# Patient Record
Sex: FEMALE | Race: BLACK OR AFRICAN AMERICAN | Employment: UNEMPLOYED | URBAN - METROPOLITAN AREA
[De-identification: names, ages, dates, MRNs, and addresses within clinical notes are randomized per-mention and may not be internally consistent; named-entity substitution may affect disease eponyms.]

---

## 2021-06-28 ENCOUNTER — HOSPITAL ENCOUNTER (EMERGENCY)
Age: 21
Discharge: HOME | End: 2021-06-28
Attending: EMERGENCY MEDICINE
Payer: MEDICAID

## 2021-06-28 ENCOUNTER — APPOINTMENT (OUTPATIENT)
Dept: ULTRASOUND IMAGING | Age: 21
End: 2021-06-28
Attending: EMERGENCY MEDICINE
Payer: MEDICAID

## 2021-06-28 VITALS
WEIGHT: 117 LBS | OXYGEN SATURATION: 100 % | TEMPERATURE: 98.3 F | SYSTOLIC BLOOD PRESSURE: 100 MMHG | HEIGHT: 62 IN | BODY MASS INDEX: 21.53 KG/M2 | HEART RATE: 84 BPM | RESPIRATION RATE: 16 BRPM | DIASTOLIC BLOOD PRESSURE: 61 MMHG

## 2021-06-28 DIAGNOSIS — B96.89 BV (BACTERIAL VAGINOSIS): ICD-10-CM

## 2021-06-28 DIAGNOSIS — O26.892 ABDOMINAL PAIN DURING PREGNANCY IN SECOND TRIMESTER: Primary | ICD-10-CM

## 2021-06-28 DIAGNOSIS — N76.0 BV (BACTERIAL VAGINOSIS): ICD-10-CM

## 2021-06-28 DIAGNOSIS — R10.9 ABDOMINAL PAIN DURING PREGNANCY IN SECOND TRIMESTER: Primary | ICD-10-CM

## 2021-06-28 LAB
ALBUMIN SERPL-MCNC: 2.6 G/DL (ref 3.5–5)
ALBUMIN/GLOB SERPL: 0.8 {RATIO} (ref 1.1–2.2)
ALP SERPL-CCNC: 49 U/L (ref 45–117)
ALT SERPL-CCNC: 16 U/L (ref 12–78)
ANION GAP SERPL CALC-SCNC: 8 MMOL/L (ref 5–15)
APPEARANCE UR: ABNORMAL
AST SERPL-CCNC: 13 U/L (ref 15–37)
BACTERIA URNS QL MICRO: ABNORMAL /HPF
BASOPHILS # BLD: 0.1 K/UL (ref 0–0.1)
BASOPHILS NFR BLD: 1 % (ref 0–1)
BILIRUB SERPL-MCNC: 0.3 MG/DL (ref 0.2–1)
BILIRUB UR QL CFM: NEGATIVE
BUN SERPL-MCNC: 7 MG/DL (ref 6–20)
BUN/CREAT SERPL: 14 (ref 12–20)
CALCIUM SERPL-MCNC: 7.9 MG/DL (ref 8.5–10.1)
CHLORIDE SERPL-SCNC: 104 MMOL/L (ref 97–108)
CLUE CELLS VAG QL WET PREP: NORMAL
CO2 SERPL-SCNC: 23 MMOL/L (ref 21–32)
COLOR UR: ABNORMAL
CREAT SERPL-MCNC: 0.51 MG/DL (ref 0.55–1.02)
DIFFERENTIAL METHOD BLD: ABNORMAL
EOSINOPHIL # BLD: 0.1 K/UL (ref 0–0.4)
EOSINOPHIL NFR BLD: 1 % (ref 0–7)
EPITH CASTS URNS QL MICRO: ABNORMAL /LPF
ERYTHROCYTE [DISTWIDTH] IN BLOOD BY AUTOMATED COUNT: 13.1 % (ref 11.5–14.5)
GLOBULIN SER CALC-MCNC: 3.2 G/DL (ref 2–4)
GLUCOSE SERPL-MCNC: 92 MG/DL (ref 65–100)
GLUCOSE UR STRIP.AUTO-MCNC: NEGATIVE MG/DL
HCG SERPL-ACNC: ABNORMAL MIU/ML (ref 0–6)
HCT VFR BLD AUTO: 31.2 % (ref 35–47)
HGB BLD-MCNC: 10.6 G/DL (ref 11.5–16)
HGB UR QL STRIP: NEGATIVE
IMM GRANULOCYTES # BLD AUTO: 0.1 K/UL (ref 0–0.04)
IMM GRANULOCYTES NFR BLD AUTO: 1 % (ref 0–0.5)
KETONES UR QL STRIP.AUTO: NEGATIVE MG/DL
KOH PREP SPEC: NORMAL
LEUKOCYTE ESTERASE UR QL STRIP.AUTO: ABNORMAL
LYMPHOCYTES # BLD: 1.4 K/UL (ref 0.8–3.5)
LYMPHOCYTES NFR BLD: 16 % (ref 12–49)
MCH RBC QN AUTO: 32.8 PG (ref 26–34)
MCHC RBC AUTO-ENTMCNC: 34 G/DL (ref 30–36.5)
MCV RBC AUTO: 96.6 FL (ref 80–99)
MONOCYTES # BLD: 0.9 K/UL (ref 0–1)
MONOCYTES NFR BLD: 10 % (ref 5–13)
MUCOUS THREADS URNS QL MICRO: ABNORMAL /LPF
NEUTS SEG # BLD: 6.3 K/UL (ref 1.8–8)
NEUTS SEG NFR BLD: 71 % (ref 32–75)
NITRITE UR QL STRIP.AUTO: NEGATIVE
NRBC # BLD: 0 K/UL (ref 0–0.01)
NRBC BLD-RTO: 0 PER 100 WBC
PH UR STRIP: 6.5 [PH] (ref 5–8)
PLATELET # BLD AUTO: 180 K/UL (ref 150–400)
PMV BLD AUTO: 10.9 FL (ref 8.9–12.9)
POTASSIUM SERPL-SCNC: 3.4 MMOL/L (ref 3.5–5.1)
PROT SERPL-MCNC: 5.8 G/DL (ref 6.4–8.2)
PROT UR STRIP-MCNC: ABNORMAL MG/DL
RBC # BLD AUTO: 3.23 M/UL (ref 3.8–5.2)
RBC #/AREA URNS HPF: ABNORMAL /HPF (ref 0–5)
RBC MORPH BLD: ABNORMAL
SERVICE CMNT-IMP: NORMAL
SODIUM SERPL-SCNC: 135 MMOL/L (ref 136–145)
SP GR UR REFRACTOMETRY: 1.02 (ref 1–1.03)
T VAGINALIS VAG QL WET PREP: NORMAL
UA: UC IF INDICATED,UAUC: ABNORMAL
UROBILINOGEN UR QL STRIP.AUTO: 1 EU/DL (ref 0.2–1)
WBC # BLD AUTO: 8.9 K/UL (ref 3.6–11)
WBC URNS QL MICRO: ABNORMAL /HPF (ref 0–4)

## 2021-06-28 PROCEDURE — 87210 SMEAR WET MOUNT SALINE/INK: CPT

## 2021-06-28 PROCEDURE — 87491 CHLMYD TRACH DNA AMP PROBE: CPT

## 2021-06-28 PROCEDURE — 36415 COLL VENOUS BLD VENIPUNCTURE: CPT

## 2021-06-28 PROCEDURE — 84702 CHORIONIC GONADOTROPIN TEST: CPT

## 2021-06-28 PROCEDURE — 76815 OB US LIMITED FETUS(S): CPT

## 2021-06-28 PROCEDURE — 80053 COMPREHEN METABOLIC PANEL: CPT

## 2021-06-28 PROCEDURE — 99283 EMERGENCY DEPT VISIT LOW MDM: CPT

## 2021-06-28 PROCEDURE — 81001 URINALYSIS AUTO W/SCOPE: CPT

## 2021-06-28 PROCEDURE — 85025 COMPLETE CBC W/AUTO DIFF WBC: CPT

## 2021-06-28 RX ORDER — METRONIDAZOLE 500 MG/1
500 TABLET ORAL 2 TIMES DAILY
Qty: 14 TABLET | Refills: 0 | Status: SHIPPED | OUTPATIENT
Start: 2021-06-28 | End: 2021-07-05

## 2021-06-28 RX ORDER — CEPHALEXIN 500 MG/1
500 CAPSULE ORAL 2 TIMES DAILY
Qty: 14 CAPSULE | Refills: 0 | Status: SHIPPED | OUTPATIENT
Start: 2021-06-28 | End: 2021-07-05

## 2021-06-28 NOTE — ED TRIAGE NOTES
A0 thinks she is 20 weeks pregnant, is not receiving prenatal care, presents for left sided abdominal pain x 2 days, denies vaginal bleeding.

## 2021-06-28 NOTE — ED PROVIDER NOTES
EMERGENCY DEPARTMENT HISTORY AND PHYSICAL EXAM      Date: 2021  Patient Name: Rosa Maria Contreras    History of Presenting Illness     Chief Complaint   Patient presents with    Pregnancy Problem       History Provided By: Patient    HPI: Rosa Maria Contreras, 24 y.o. female with PMHx significant for nothing,  currently approximately 25wks pregnant based on US in Feb who presents with a cc of RLQ abd pain for the last 2 days. Pain is described as sharp, worse with movement. She has has no prenatal care. Had an [address] in Feb at an OSH when she was having bleeding and was dx with a subchorionic hemorrhage. She is concerned this may have recurred. No vaginal bleeding, discharge, or loss of fluid. +fetal movement. No urinary sx. No N/V/D. No CP or SOB. No prior abd surgeries. PCP: Other, MD Carlene    There are no other complaints, changes, or physical findings at this time. Past History     Past Medical History:  History reviewed. No pertinent past medical history. Past Surgical History:  History reviewed. No pertinent surgical history. Family History:  History reviewed. No pertinent family history. Social History:  Social History     Tobacco Use    Smoking status: Current Every Day Smoker    Smokeless tobacco: Never Used   Substance Use Topics    Alcohol use: Not Currently    Drug use: Yes     Types: Heroin     Allergies:  No Known Allergies  Review of Systems   Review of Systems   Constitutional: Negative for chills and fever. HENT: Negative for congestion, rhinorrhea and sore throat. Respiratory: Negative for cough and shortness of breath. Cardiovascular: Negative for chest pain. Gastrointestinal: Positive for abdominal pain. Negative for nausea and vomiting. Genitourinary: Negative for dysuria and urgency. Skin: Negative for rash. Neurological: Negative for dizziness, light-headedness and headaches. All other systems reviewed and are negative.     Physical Exam   Physical Exam  Vitals and nursing note reviewed. Constitutional:       General: She is not in acute distress. Appearance: She is well-developed. HENT:      Head: Normocephalic and atraumatic. Eyes:      Conjunctiva/sclera: Conjunctivae normal.      Pupils: Pupils are equal, round, and reactive to light. Cardiovascular:      Rate and Rhythm: Normal rate and regular rhythm. Pulmonary:      Effort: Pulmonary effort is normal. No respiratory distress. Breath sounds: Normal breath sounds. No stridor. Abdominal:      Palpations: Abdomen is soft. Tenderness: There is no abdominal tenderness. Comments: gravid   Musculoskeletal:         General: Normal range of motion. Cervical back: Normal range of motion. Skin:     General: Skin is warm and dry. Neurological:      Mental Status: She is alert and oriented to person, place, and time. Diagnostic Study Results   Labs -     Recent Results (from the past 12 hour(s))   RENATA, OTHER SOURCES    Collection Time: 06/28/21  3:28 PM    Specimen: Vagina; Other   Result Value Ref Range    Special Requests: NO SPECIAL REQUESTS      KOH NO YEAST SEEN     WET PREP    Collection Time: 06/28/21  3:28 PM    Specimen: Miscellaneous sample   Result Value Ref Range    Clue cells CLUE CELLS PRESENT      Wet prep NO TRICHOMONAS SEEN     CBC WITH AUTOMATED DIFF    Collection Time: 06/28/21  3:28 PM   Result Value Ref Range    WBC 8.9 3.6 - 11.0 K/uL    RBC 3.23 (L) 3.80 - 5.20 M/uL    HGB 10.6 (L) 11.5 - 16.0 g/dL    HCT 31.2 (L) 35.0 - 47.0 %    MCV 96.6 80.0 - 99.0 FL    MCH 32.8 26.0 - 34.0 PG    MCHC 34.0 30.0 - 36.5 g/dL    RDW 13.1 11.5 - 14.5 %    PLATELET 677 895 - 682 K/uL    MPV 10.9 8.9 - 12.9 FL    NRBC 0.0 0  WBC    ABSOLUTE NRBC 0.00 0.00 - 0.01 K/uL    NEUTROPHILS 71 32 - 75 %    LYMPHOCYTES 16 12 - 49 %    MONOCYTES 10 5 - 13 %    EOSINOPHILS 1 0 - 7 %    BASOPHILS 1 0 - 1 %    IMMATURE GRANULOCYTES 1 (H) 0.0 - 0.5 %    ABS.  NEUTROPHILS 6.3 1.8 - 8.0 K/UL ABS. LYMPHOCYTES 1.4 0.8 - 3.5 K/UL    ABS. MONOCYTES 0.9 0.0 - 1.0 K/UL    ABS. EOSINOPHILS 0.1 0.0 - 0.4 K/UL    ABS. BASOPHILS 0.1 0.0 - 0.1 K/UL    ABS. IMM. GRANS. 0.1 (H) 0.00 - 0.04 K/UL    DF SMEAR SCANNED      RBC COMMENTS ANISOCYTOSIS  2+       METABOLIC PANEL, COMPREHENSIVE    Collection Time: 06/28/21  3:28 PM   Result Value Ref Range    Sodium 135 (L) 136 - 145 mmol/L    Potassium 3.4 (L) 3.5 - 5.1 mmol/L    Chloride 104 97 - 108 mmol/L    CO2 23 21 - 32 mmol/L    Anion gap 8 5 - 15 mmol/L    Glucose 92 65 - 100 mg/dL    BUN 7 6 - 20 MG/DL    Creatinine 0.51 (L) 0.55 - 1.02 MG/DL    BUN/Creatinine ratio 14 12 - 20      GFR est AA >60 >60 ml/min/1.73m2    GFR est non-AA >60 >60 ml/min/1.73m2    Calcium 7.9 (L) 8.5 - 10.1 MG/DL    Bilirubin, total 0.3 0.2 - 1.0 MG/DL    ALT (SGPT) 16 12 - 78 U/L    AST (SGOT) 13 (L) 15 - 37 U/L    Alk.  phosphatase 49 45 - 117 U/L    Protein, total 5.8 (L) 6.4 - 8.2 g/dL    Albumin 2.6 (L) 3.5 - 5.0 g/dL    Globulin 3.2 2.0 - 4.0 g/dL    A-G Ratio 0.8 (L) 1.1 - 2.2     BETA HCG, QT    Collection Time: 06/28/21  3:28 PM   Result Value Ref Range    Beta HCG, QT 18,854 (H) 0 - 6 MIU/ML   URINALYSIS W/ REFLEX CULTURE    Collection Time: 06/28/21  4:06 PM    Specimen: Miscellaneous sample    Urine specimen   Result Value Ref Range    Color DARK YELLOW      Appearance CLOUDY (A) CLEAR      Specific gravity 1.025 1.003 - 1.030      pH (UA) 6.5 5.0 - 8.0      Protein TRACE (A) NEG mg/dL    Glucose Negative NEG mg/dL    Ketone Negative NEG mg/dL    Blood Negative NEG      Urobilinogen 1.0 0.2 - 1.0 EU/dL    Nitrites Negative NEG      Leukocyte Esterase SMALL (A) NEG      WBC 0-4 0 - 4 /hpf    RBC 0-5 0 - 5 /hpf    Epithelial cells MANY (A) FEW /lpf    Bacteria 2+ (A) NEG /hpf    UA:UC IF INDICATED CULTURE NOT INDICATED BY UA RESULT CNI      Mucus 2+ (A) NEG /lpf   BILIRUBIN, CONFIRM    Collection Time: 06/28/21  4:06 PM   Result Value Ref Range    Bilirubin UA, confirm Negative NEG         Radiologic Studies -   US MilaS LTD   Final Result   Single viable 25 week 2 day intrauterine pregnancy. US MilaS LTD    Result Date: 6/28/2021  Single viable 25 week 2 day intrauterine pregnancy. Medical Decision Making   I am the first provider for this patient. I reviewed the vital signs, available nursing notes, past medical history, past surgical history, family history and social history. Vital Signs-Reviewed the patient's vital signs. Patient Vitals for the past 12 hrs:   Temp Pulse Resp BP SpO2   06/28/21 1452 98.3 °F (36.8 °C) 84 16 100/61 100 %       Pulse Oximetry Analysis - 100% on ra      Records Reviewed: Nursing Notes and Old Medical Records    Provider Notes (Medical Decision Making):   Patient presents with a chief complaint of abdominal pain and pregnancy. Notes that she had an ultrasound in February that showed her at 7 weeks, based on this her approximate gestational age is around 22 weeks. On exam she is overall well-appearing with stable vital signs. Has no significant lower abdominal tenderness on exam but abdomen is gravid. Will check basic lab work, pelvic swabs, ultrasound, urinalysis. Differential includes round ligament pain, UTI, STI. Less likely appendicitis given no significant pain on palpation. ED Course:   Initial assessment performed. The patients presenting problems have been discussed, and they are in agreement with the care plan formulated and outlined with them. I have encouraged them to ask questions as they arise throughout their visit. ED Course as of Jun 28 1648   Mon Jun 28, 2021   1520 FHR in the 150s    [HEVER]      ED Course User Index  Jann rFiedman MD       Ultrasound shows pregnancy at 25 weeks and 2 days. Swabs notable for BV. We will treat with Flagyl. She does have bacteria in her urine however it is a contaminated urine. Given pregnancy as well as overall poor follow-up will start on antibiotics. Patient was provided resources for outpatient follow-up. Procedures:  Procedures    Critical Care:  none    Disposition:  Discharge Note:  The patient has been re-evaluated and is ready for discharge. Reviewed available results with patient. Counseled patient on diagnosis and care plan. Patient has expressed understanding, and all questions have been answered. Patient agrees with plan and agrees to follow up as recommended, or to return to the ED if their symptoms worsen. Discharge instructions have been provided and explained to the patient, along with reasons to return to the ED. PLAN:  1. Current Discharge Medication List      START taking these medications    Details   metroNIDAZOLE (FlagyL) 500 mg tablet Take 1 Tablet by mouth two (2) times a day for 7 days. Qty: 14 Tablet, Refills: 0  Start date: 6/28/2021, End date: 7/5/2021      cephALEXin (Keflex) 500 mg capsule Take 1 Capsule by mouth two (2) times a day for 7 days. Qty: 14 Capsule, Refills: 0  Start date: 6/28/2021, End date: 7/5/2021           2. Follow-up Information     Follow up With Specialties Details Why [address] [place] [address]  Schedule an appointment as soon as possible for a visit   [address] [place] [address] [address] [address]  [phone]    [address] [place] [address] EMERGENCY DEPT Emergency Medicine  As needed, If symptoms worsen Har[address]reet [address]        Return to ED if worse     Diagnosis     Clinical Impression:   1. Abdominal pain during pregnancy in second trimester    2. BV (bacterial vaginosis)            Please note that this dictation was completed with [employer], the computer voice recognition software. Quite often unanticipated grammatical, syntax, homophones, and other interpretive errors are inadvertently transcribed by the computer software. Please disregard these errors.   Please excuse any errors that have escaped final proofreading

## 2021-06-28 NOTE — PROGRESS NOTES
CM opened case for assessment of D/C planning needs, CM reviewed chart. CM receive consult patient pregnant and needs housing resources also has not received prenatal care and and current substance use. CM provide patient with resources and number to CC with [facility] for addition resources.      [address] [place] [address]  [phone]

## 2021-06-28 NOTE — ED NOTES
Pt in with right lower quadrant abdominal pain that started this morning. Pt is approximately 25 weeks pregnant with her second child, has had no prenatal care, and is a 5x per week heroin user. Pt reports being diagnosed with a subchorionic hemorrhage at 7 weeks, is concerned for this new onset of pain. She endorses fetal movement, FHR 150s. Pt denies vaginal bleeding, dysuria. Emergency Department Nursing Plan of Care       The Nursing Plan of Care is developed from the Nursing assessment and Emergency Department Attending provider initial evaluation. The plan of care may be reviewed in the ED Provider note.     The Plan of Care was developed with the following considerations:   Patient / Family readiness to learn indicated by:verbalized understanding  Persons(s) to be included in education: patient  Barriers to Learning/Limitations:No    Signed     Claudia Melendez RN    6/28/2021   4:40 PM

## 2021-06-30 LAB
C TRACH RRNA SPEC QL NAA+PROBE: NEGATIVE
N GONORRHOEA RRNA SPEC QL NAA+PROBE: NEGATIVE
PLEASE NOTE:, 188601: NORMAL
SPECIMEN SOURCE: NORMAL

## 2025-03-23 ENCOUNTER — ANESTHESIA EVENT (OUTPATIENT)
Facility: HOSPITAL | Age: 25
End: 2025-03-23

## 2025-03-23 ENCOUNTER — ANESTHESIA (OUTPATIENT)
Facility: HOSPITAL | Age: 25
End: 2025-03-23

## 2025-03-23 ENCOUNTER — HOSPITAL ENCOUNTER (INPATIENT)
Facility: HOSPITAL | Age: 25
LOS: 2 days | Discharge: HOME | DRG: 560 | End: 2025-03-25
Attending: OBSTETRICS & GYNECOLOGY | Admitting: OBSTETRICS & GYNECOLOGY
Payer: MEDICAID

## 2025-03-23 PROBLEM — O47.9 UTERINE CONTRACTIONS: Status: ACTIVE | Noted: 2025-03-23

## 2025-03-23 LAB
ABO + RH BLD: NORMAL
AMPHET UR QL SCN: NEGATIVE
APPEARANCE UR: CLEAR
BARBITURATES UR QL SCN: NEGATIVE
BASOPHILS # BLD: 0.04 K/UL (ref 0–0.1)
BASOPHILS NFR BLD: 0.3 % (ref 0–1)
BENZODIAZ UR QL: NEGATIVE
BILIRUB UR QL: NEGATIVE
BLOOD GROUP ANTIBODIES SERPL: NORMAL
CANNABINOIDS UR QL SCN: POSITIVE
COCAINE UR QL SCN: POSITIVE
COLOR UR: ABNORMAL
DIFFERENTIAL METHOD BLD: ABNORMAL
EOSINOPHIL # BLD: 0.02 K/UL (ref 0–0.4)
EOSINOPHIL NFR BLD: 0.2 % (ref 0–7)
ERYTHROCYTE [DISTWIDTH] IN BLOOD BY AUTOMATED COUNT: 12.5 % (ref 11.5–14.5)
GLUCOSE UR STRIP.AUTO-MCNC: NEGATIVE MG/DL
HBV SURFACE AG SER QL: 0.15 INDEX
HBV SURFACE AG SER QL: NEGATIVE
HCT VFR BLD AUTO: 40.1 % (ref 35–47)
HCV AB SER IA-ACNC: 0.09 INDEX
HCV AB SERPL QL IA: NONREACTIVE
HGB BLD-MCNC: 13.6 G/DL (ref 11.5–16)
HGB UR QL STRIP: NEGATIVE
HIV 1+2 AB+HIV1 P24 AG SERPL QL IA: NONREACTIVE
HIV 1/2 RESULT COMMENT: NORMAL
IMM GRANULOCYTES # BLD AUTO: 0.07 K/UL (ref 0–0.04)
IMM GRANULOCYTES NFR BLD AUTO: 0.6 % (ref 0–0.5)
KETONES UR QL STRIP.AUTO: 40 MG/DL
LEUKOCYTE ESTERASE UR QL STRIP.AUTO: NEGATIVE
LYMPHOCYTES # BLD: 1.81 K/UL (ref 0.8–3.5)
LYMPHOCYTES NFR BLD: 14.9 % (ref 12–49)
Lab: ABNORMAL
MCH RBC QN AUTO: 31.2 PG (ref 26–34)
MCHC RBC AUTO-ENTMCNC: 33.9 G/DL (ref 30–36.5)
MCV RBC AUTO: 92 FL (ref 80–99)
METHADONE UR QL: NEGATIVE
MONOCYTES # BLD: 1.28 K/UL (ref 0–1)
MONOCYTES NFR BLD: 10.5 % (ref 5–13)
NEUTS SEG # BLD: 8.96 K/UL (ref 1.8–8)
NEUTS SEG NFR BLD: 73.5 % (ref 32–75)
NITRITE UR QL STRIP.AUTO: NEGATIVE
NRBC # BLD: 0 K/UL (ref 0–0.01)
NRBC BLD-RTO: 0 PER 100 WBC
OPIATES UR QL: NEGATIVE
PCP UR QL: NEGATIVE
PH UR STRIP: 7 (ref 5–8)
PLATELET # BLD AUTO: 196 K/UL (ref 150–400)
PMV BLD AUTO: 11.1 FL (ref 8.9–12.9)
PROT UR STRIP-MCNC: NEGATIVE MG/DL
RBC # BLD AUTO: 4.36 M/UL (ref 3.8–5.2)
RUBV IGG SERPL IA-ACNC: NORMAL IU/ML
SP GR UR REFRACTOMETRY: 1.02 (ref 1–1.03)
SPECIMEN EXP DATE BLD: NORMAL
UROBILINOGEN UR QL STRIP.AUTO: 0.2 EU/DL (ref 0.2–1)
WBC # BLD AUTO: 12.2 K/UL (ref 3.6–11)

## 2025-03-23 PROCEDURE — 85025 COMPLETE CBC W/AUTO DIFF WBC: CPT

## 2025-03-23 PROCEDURE — 3700000025 EPIDURAL BLOCK: Performed by: ANESTHESIOLOGY

## 2025-03-23 PROCEDURE — 87081 CULTURE SCREEN ONLY: CPT

## 2025-03-23 PROCEDURE — 7220000101 HC DELIVERY VAGINAL/SINGLE

## 2025-03-23 PROCEDURE — 2500000003 HC RX 250 WO HCPCS: Performed by: ANESTHESIOLOGY

## 2025-03-23 PROCEDURE — 81002 URINALYSIS NONAUTO W/O SCOPE: CPT

## 2025-03-23 PROCEDURE — 7210000100 HC LABOR FEE PER 1 HR

## 2025-03-23 PROCEDURE — 2580000003 HC RX 258: Performed by: ADVANCED PRACTICE MIDWIFE

## 2025-03-23 PROCEDURE — 86901 BLOOD TYPING SEROLOGIC RH(D): CPT

## 2025-03-23 PROCEDURE — 1120000000 HC RM PRIVATE OB

## 2025-03-23 PROCEDURE — 87389 HIV-1 AG W/HIV-1&-2 AB AG IA: CPT

## 2025-03-23 PROCEDURE — 4500000002 HC ER NO CHARGE

## 2025-03-23 PROCEDURE — 80307 DRUG TEST PRSMV CHEM ANLYZR: CPT

## 2025-03-23 PROCEDURE — 6360000002 HC RX W HCPCS

## 2025-03-23 PROCEDURE — 00HU33Z INSERTION OF INFUSION DEVICE INTO SPINAL CANAL, PERCUTANEOUS APPROACH: ICD-10-PCS | Performed by: ANESTHESIOLOGY

## 2025-03-23 PROCEDURE — 86762 RUBELLA ANTIBODY: CPT

## 2025-03-23 PROCEDURE — 87591 N.GONORRHOEAE DNA AMP PROB: CPT

## 2025-03-23 PROCEDURE — 86850 RBC ANTIBODY SCREEN: CPT

## 2025-03-23 PROCEDURE — 6370000000 HC RX 637 (ALT 250 FOR IP): Performed by: ADVANCED PRACTICE MIDWIFE

## 2025-03-23 PROCEDURE — 6360000002 HC RX W HCPCS: Performed by: ADVANCED PRACTICE MIDWIFE

## 2025-03-23 PROCEDURE — 7100000000 HC PACU RECOVERY - FIRST 15 MIN

## 2025-03-23 PROCEDURE — 86803 HEPATITIS C AB TEST: CPT

## 2025-03-23 PROCEDURE — 86592 SYPHILIS TEST NON-TREP QUAL: CPT

## 2025-03-23 PROCEDURE — 6360000002 HC RX W HCPCS: Performed by: ANESTHESIOLOGY

## 2025-03-23 PROCEDURE — 7100000001 HC PACU RECOVERY - ADDTL 15 MIN

## 2025-03-23 PROCEDURE — 99465 NB RESUSCITATION: CPT

## 2025-03-23 PROCEDURE — 87491 CHLMYD TRACH DNA AMP PROBE: CPT

## 2025-03-23 PROCEDURE — 87340 HEPATITIS B SURFACE AG IA: CPT

## 2025-03-23 PROCEDURE — 51701 INSERT BLADDER CATHETER: CPT

## 2025-03-23 PROCEDURE — 99284 EMERGENCY DEPT VISIT MOD MDM: CPT

## 2025-03-23 PROCEDURE — 86900 BLOOD TYPING SEROLOGIC ABO: CPT

## 2025-03-23 PROCEDURE — 4A1HXCZ MONITORING OF PRODUCTS OF CONCEPTION, CARDIAC RATE, EXTERNAL APPROACH: ICD-10-PCS | Performed by: OBSTETRICS & GYNECOLOGY

## 2025-03-23 RX ORDER — SODIUM CHLORIDE 0.9 % (FLUSH) 0.9 %
5-40 SYRINGE (ML) INJECTION PRN
Status: DISCONTINUED | OUTPATIENT
Start: 2025-03-23 | End: 2025-03-23

## 2025-03-23 RX ORDER — DOCUSATE SODIUM 100 MG/1
100 CAPSULE, LIQUID FILLED ORAL 2 TIMES DAILY PRN
Status: DISCONTINUED | OUTPATIENT
Start: 2025-03-23 | End: 2025-03-25 | Stop reason: HOSPADM

## 2025-03-23 RX ORDER — PENICILLIN G POTASSIUM 5000000 [IU]/1
INJECTION, POWDER, FOR SOLUTION INTRAMUSCULAR; INTRAVENOUS
Status: COMPLETED
Start: 2025-03-23 | End: 2025-03-23

## 2025-03-23 RX ORDER — CARBOPROST TROMETHAMINE 250 UG/ML
250 INJECTION, SOLUTION INTRAMUSCULAR PRN
Status: DISCONTINUED | OUTPATIENT
Start: 2025-03-23 | End: 2025-03-23

## 2025-03-23 RX ORDER — SODIUM CHLORIDE, SODIUM LACTATE, POTASSIUM CHLORIDE, AND CALCIUM CHLORIDE .6; .31; .03; .02 G/100ML; G/100ML; G/100ML; G/100ML
1000 INJECTION, SOLUTION INTRAVENOUS PRN
Status: DISCONTINUED | OUTPATIENT
Start: 2025-03-23 | End: 2025-03-23

## 2025-03-23 RX ORDER — FENTANYL/BUPIVACAINE/NS/PF 2-1250MCG
1-15 PLASTIC BAG, INJECTION (ML) INJECTION CONTINUOUS
Refills: 0 | Status: DISCONTINUED | OUTPATIENT
Start: 2025-03-23 | End: 2025-03-23

## 2025-03-23 RX ORDER — ACETAMINOPHEN 500 MG
1000 TABLET ORAL EVERY 8 HOURS SCHEDULED
Status: DISCONTINUED | OUTPATIENT
Start: 2025-03-23 | End: 2025-03-25 | Stop reason: HOSPADM

## 2025-03-23 RX ORDER — NALBUPHINE HYDROCHLORIDE 10 MG/ML
5 INJECTION INTRAMUSCULAR; INTRAVENOUS; SUBCUTANEOUS EVERY 4 HOURS PRN
Status: DISCONTINUED | OUTPATIENT
Start: 2025-03-23 | End: 2025-03-23

## 2025-03-23 RX ORDER — BUPRENORPHINE 8 MG/1
8 TABLET SUBLINGUAL DAILY
COMMUNITY

## 2025-03-23 RX ORDER — LIDOCAINE HYDROCHLORIDE AND EPINEPHRINE 15; 5 MG/ML; UG/ML
INJECTION, SOLUTION EPIDURAL
Status: DISCONTINUED | OUTPATIENT
Start: 2025-03-23 | End: 2025-03-23 | Stop reason: SDUPTHER

## 2025-03-23 RX ORDER — ONDANSETRON 2 MG/ML
4 INJECTION INTRAMUSCULAR; INTRAVENOUS EVERY 6 HOURS PRN
Status: DISCONTINUED | OUTPATIENT
Start: 2025-03-23 | End: 2025-03-23

## 2025-03-23 RX ORDER — SEVOFLURANE 250 ML/250ML
1 LIQUID RESPIRATORY (INHALATION) CONTINUOUS PRN
Status: DISCONTINUED | OUTPATIENT
Start: 2025-03-23 | End: 2025-03-23

## 2025-03-23 RX ORDER — BUPIVACAINE HYDROCHLORIDE 2.5 MG/ML
INJECTION, SOLUTION EPIDURAL; INFILTRATION; INTRACAUDAL; PERINEURAL
Status: DISCONTINUED | OUTPATIENT
Start: 2025-03-23 | End: 2025-03-23 | Stop reason: SDUPTHER

## 2025-03-23 RX ORDER — ONDANSETRON 4 MG/1
4 TABLET, ORALLY DISINTEGRATING ORAL EVERY 6 HOURS PRN
Status: DISCONTINUED | OUTPATIENT
Start: 2025-03-23 | End: 2025-03-25 | Stop reason: HOSPADM

## 2025-03-23 RX ORDER — SIMETHICONE 80 MG
80 TABLET,CHEWABLE ORAL EVERY 6 HOURS PRN
Status: DISCONTINUED | OUTPATIENT
Start: 2025-03-23 | End: 2025-03-25 | Stop reason: HOSPADM

## 2025-03-23 RX ORDER — SODIUM CHLORIDE 9 MG/ML
INJECTION, SOLUTION INTRAVENOUS PRN
Status: DISCONTINUED | OUTPATIENT
Start: 2025-03-23 | End: 2025-03-23

## 2025-03-23 RX ORDER — SODIUM CHLORIDE, SODIUM LACTATE, POTASSIUM CHLORIDE, CALCIUM CHLORIDE 600; 310; 30; 20 MG/100ML; MG/100ML; MG/100ML; MG/100ML
INJECTION, SOLUTION INTRAVENOUS CONTINUOUS
Status: DISCONTINUED | OUTPATIENT
Start: 2025-03-23 | End: 2025-03-23

## 2025-03-23 RX ORDER — ONDANSETRON 2 MG/ML
4 INJECTION INTRAMUSCULAR; INTRAVENOUS EVERY 6 HOURS PRN
Status: DISCONTINUED | OUTPATIENT
Start: 2025-03-23 | End: 2025-03-25 | Stop reason: HOSPADM

## 2025-03-23 RX ORDER — METHYLERGONOVINE MALEATE 0.2 MG/ML
200 INJECTION INTRAVENOUS PRN
Status: DISCONTINUED | OUTPATIENT
Start: 2025-03-23 | End: 2025-03-23

## 2025-03-23 RX ORDER — NALOXONE HYDROCHLORIDE 0.4 MG/ML
INJECTION, SOLUTION INTRAMUSCULAR; INTRAVENOUS; SUBCUTANEOUS PRN
Status: DISCONTINUED | OUTPATIENT
Start: 2025-03-23 | End: 2025-03-23

## 2025-03-23 RX ORDER — BUPIVACAINE HYDROCHLORIDE 2.5 MG/ML
INJECTION, SOLUTION EPIDURAL; INFILTRATION; INTRACAUDAL; PERINEURAL
Status: COMPLETED
Start: 2025-03-23 | End: 2025-03-23

## 2025-03-23 RX ORDER — TERBUTALINE SULFATE 1 MG/ML
0.25 INJECTION SUBCUTANEOUS
Status: DISCONTINUED | OUTPATIENT
Start: 2025-03-23 | End: 2025-03-23

## 2025-03-23 RX ORDER — ONDANSETRON 4 MG/1
4 TABLET, ORALLY DISINTEGRATING ORAL EVERY 6 HOURS PRN
Status: DISCONTINUED | OUTPATIENT
Start: 2025-03-23 | End: 2025-03-23

## 2025-03-23 RX ORDER — FENTANYL CITRATE 50 UG/ML
INJECTION, SOLUTION INTRAMUSCULAR; INTRAVENOUS
Status: DISCONTINUED | OUTPATIENT
Start: 2025-03-23 | End: 2025-03-23 | Stop reason: SDUPTHER

## 2025-03-23 RX ORDER — BUPRENORPHINE 2 MG/1
8 TABLET SUBLINGUAL EVERY EVENING
Status: DISCONTINUED | OUTPATIENT
Start: 2025-03-23 | End: 2025-03-25 | Stop reason: HOSPADM

## 2025-03-23 RX ORDER — IBUPROFEN 400 MG/1
800 TABLET, FILM COATED ORAL EVERY 8 HOURS SCHEDULED
Status: DISCONTINUED | OUTPATIENT
Start: 2025-03-23 | End: 2025-03-25 | Stop reason: HOSPADM

## 2025-03-23 RX ORDER — MISOPROSTOL 200 UG/1
400 TABLET ORAL PRN
Status: DISCONTINUED | OUTPATIENT
Start: 2025-03-23 | End: 2025-03-23

## 2025-03-23 RX ORDER — FENTANYL CITRATE 50 UG/ML
INJECTION, SOLUTION INTRAMUSCULAR; INTRAVENOUS
Status: COMPLETED
Start: 2025-03-23 | End: 2025-03-23

## 2025-03-23 RX ORDER — MODIFIED LANOLIN
OINTMENT (GRAM) TOPICAL PRN
Status: DISCONTINUED | OUTPATIENT
Start: 2025-03-23 | End: 2025-03-25 | Stop reason: HOSPADM

## 2025-03-23 RX ORDER — EPHEDRINE SULFATE 50 MG/ML
10 INJECTION INTRAVENOUS
Status: DISCONTINUED | OUTPATIENT
Start: 2025-03-23 | End: 2025-03-23

## 2025-03-23 RX ORDER — SODIUM CHLORIDE, SODIUM LACTATE, POTASSIUM CHLORIDE, AND CALCIUM CHLORIDE .6; .31; .03; .02 G/100ML; G/100ML; G/100ML; G/100ML
500 INJECTION, SOLUTION INTRAVENOUS PRN
Status: DISCONTINUED | OUTPATIENT
Start: 2025-03-23 | End: 2025-03-23

## 2025-03-23 RX ORDER — SODIUM CHLORIDE 0.9 % (FLUSH) 0.9 %
5-40 SYRINGE (ML) INJECTION EVERY 12 HOURS SCHEDULED
Status: DISCONTINUED | OUTPATIENT
Start: 2025-03-23 | End: 2025-03-23

## 2025-03-23 RX ADMIN — DOCUSATE SODIUM 100 MG: 100 CAPSULE, LIQUID FILLED ORAL at 20:08

## 2025-03-23 RX ADMIN — Medication 10 ML/HR: at 09:23

## 2025-03-23 RX ADMIN — BUPRENORPHINE HCL 8 MG: 2 TABLET SUBLINGUAL at 17:49

## 2025-03-23 RX ADMIN — SODIUM CHLORIDE, SODIUM LACTATE, POTASSIUM CHLORIDE, AND CALCIUM CHLORIDE: .6; .31; .03; .02 INJECTION, SOLUTION INTRAVENOUS at 08:25

## 2025-03-23 RX ADMIN — PENICILLIN G POTASSIUM 5 MILLION UNITS: 5000000 INJECTION, POWDER, FOR SOLUTION INTRAMUSCULAR; INTRAVENOUS at 09:14

## 2025-03-23 RX ADMIN — ACETAMINOPHEN 1000 MG: 500 TABLET ORAL at 13:11

## 2025-03-23 RX ADMIN — LIDOCAINE HYDROCHLORIDE,EPINEPHRINE BITARTRATE 3 ML: 15; .005 INJECTION, SOLUTION EPIDURAL; INFILTRATION; INTRACAUDAL; PERINEURAL at 08:56

## 2025-03-23 RX ADMIN — OXYTOCIN 87.3 MILLI-UNITS/MIN: 10 INJECTION, SOLUTION INTRAMUSCULAR; INTRAVENOUS at 10:43

## 2025-03-23 RX ADMIN — ACETAMINOPHEN 1000 MG: 500 TABLET ORAL at 20:08

## 2025-03-23 RX ADMIN — BUPIVACAINE HYDROCHLORIDE 6 ML: 2.5 INJECTION, SOLUTION EPIDURAL; INFILTRATION; INTRACAUDAL; PERINEURAL at 09:00

## 2025-03-23 RX ADMIN — FENTANYL CITRATE 100 MCG: 50 INJECTION INTRAMUSCULAR; INTRAVENOUS at 09:00

## 2025-03-23 RX ADMIN — IBUPROFEN 800 MG: 400 TABLET, FILM COATED ORAL at 17:52

## 2025-03-23 RX ADMIN — Medication 166.7 ML: at 10:31

## 2025-03-23 RX ADMIN — ONDANSETRON 4 MG: 2 INJECTION, SOLUTION INTRAMUSCULAR; INTRAVENOUS at 09:37

## 2025-03-23 ASSESSMENT — PAIN DESCRIPTION - LOCATION: LOCATION: ABDOMEN

## 2025-03-23 ASSESSMENT — PAIN DESCRIPTION - ORIENTATION: ORIENTATION: LOWER

## 2025-03-23 ASSESSMENT — PAIN DESCRIPTION - DESCRIPTORS: DESCRIPTORS: ACHING

## 2025-03-23 ASSESSMENT — PAIN SCALES - GENERAL: PAINLEVEL_OUTOF10: 5

## 2025-03-23 NOTE — H&P
Department of Obstetrics and Gynecology  Nurse Practitioner Inpatient Obstetrics History and Physical           CHIEF COMPLAINT:  contractions     HISTORY OF PRESENT ILLNESS:       The patient is a 24 y.o.  3 parity  at 40w3d with an MONTY of 3/20/25.  Patient presents to l and d from the [facility] for contractions that started at 0600 this morning. They are now every couple minutes and about a 7/10 on the pain scale. Pt denies LOF, reports some bloody show, endorses good fetal movement.      PRENATAL CARE:     Provider:  None, no prenatal care. Per pt was seen in urgent care once and was given an MONTY of 3/20/25.     Pt denies all medication history: Denies hx of HTN, DM, STI, drug use and reports two prior vaginal deliveries.      Per pt record in Epic:     Hx heroine use, on subutex, pt asked, admits, reports last subutex last night     STI screening on 10/15/2021 was positive for chlamydia.     Per record: 2016 suicide attempt                          OB History    Para Term  AB Living    1              SAB IAB Ectopic Molar Multiple Live Births                       # Outcome Date GA Lbr Pranay/2nd Weight Sex Type Anes PTL Lv   1 Current                        Past Gynecological History:       Last menstrual period:  No LMP recorded. Patient is pregnant.  History of uterine fibroids:  No  History of endometriosis:  No  Sexually transmitted disease history: Chlamydia     Past Medical History:    Past Medical History   No past medical history on file.     Past Surgical History:    Past Surgical History   No past surgical history on file.     Family History:   Family History   No family history on file.     Medications Prior to Admission:  Prescriptions Prior to Admission   No medications prior to admission.     Allergies:  Patient has no known allergies.     REVIEW OF SYSTEMS:     ROS negative except as outlined above     PHYSICAL EXAM:     Vitals        Vitals:     25 0822 25 0833   BP: 
WDL

## 2025-03-23 NOTE — ED PROVIDER NOTES
Department of Obstetrics and Gynecology  Nurse Practitioner [facility] Obstetrics History and Physical        CHIEF COMPLAINT:  contractions    HISTORY OF PRESENT ILLNESS:      The patient is a 24 y.o.  3 parity 2002 at 40w3d with an MONTY of 3/20/25.  Patient presents to the [facility] for contractions that started at 0600 this morning. They are now every couple minutes and about a 7/10 on the pain scale. Pt denies LOF, reports some bloody show, endorses good fetal movement.     PRENATAL CARE:    Provider:  None, no prenatal care. Per pt was seen in urgent care once and was given an MONTY of 3/20/25.    Pt denies all medication history: Denies hx of HTN, DM, STI, drug use and reports two prior vaginal deliveries.     Per pt record in Epic:    Hx heroine use, on subutex, pt asked, admits, reports last subutex last night    STI screening on 10/15/2021 was positive for chlamydia.    Per record: 2016 suicide attempt      OB History    Para Term  AB Living   1        SAB IAB Ectopic Molar Multiple Live Births              # Outcome Date GA Lbr Pranay/2nd Weight Sex Type Anes PTL Lv   1 Current              Past Gynecological History:      Last menstrual period:  No LMP recorded. Patient is pregnant.  History of uterine fibroids:  No  History of endometriosis:  No  Sexually transmitted disease history: Chlamydia    Past Medical History:    No past medical history on file.  Past Surgical History:    No past surgical history on file.  Family History:   No family history on file.  Medications Prior to Admission:  No medications prior to admission.  Allergies:  Patient has no known allergies.    REVIEW OF SYSTEMS:    ROS negative except as outlined above    PHYSICAL EXAM:    Vitals:    25 0822 25 0833   BP:  122/75   Pulse:  86   Resp:  16   Temp:  98 °F (36.7 °C)   TempSrc:  Oral   SpO2:  99%   Weight: 54.4 kg (120 lb) 54.4 kg (120 lb)   Height: 1.575 m (5' 2\") 1.575 m (5' 2\")        General appearance:

## 2025-03-23 NOTE — PROGRESS NOTES
0810 TRANSFER - IN REPORT:    Bedside and Verbal report received from AILEEN Dubon RN on Anni Singer  being received from [facility] for routine progression of patient care      Report consisted of patient's Situation, Background, Assessment and   Recommendations(SBAR).     Information from the following report(s) Nurse Handoff Report was reviewed with the receiving nurse.    Opportunity for questions and clarification was provided.      Assessment completed upon patient's arrival to unit and care assumed.    0815 Patient very uncomfortable, desires epidural as soon as possible  0818 External fetal monitor applied  0825 IV inserted # 20 g to LFA, prenatal labs obtained per orders lactated ringers started at 125 ml hour  0826 IVF bolus started  0849 Dr. Peace at bedside, intra procedural time out done  0850 Epidural started   0856 Epidural placed  0904 Repositioned to left side  0911 emesis  0937 Zofran 4 mg IV given  0945 Comfortable with contractions, straight cath for 100 ml clear yellow urine, specimen obtained per orders  0950 Repositioned to right side  1005 Complains of rectal pressure, SVE bulging membranes , NATALIIA Hollis CNM notified  1019 NATALIIA Hollis CNM at bedside,SVE 10/100/0, AROM clear fluid  1020 Begins pushing, NICU team called to attend delivery  1027 Delivery male infant, NICU team present, see delivery summary for additional documentation  1040 Dr. Hdz, NICU discussing transfer of baby to NICU  1048 Comfortable  1115 Ginger ale given  1230 Temp 100.6 orally, Fundus U+1, deviated to right  1235 Straight cath for 500 ml clear yellow urine  1240 Partial bed bath given, epidural catheter removed black tip intact  1330 TRANSFER - OUT REPORT:    Bedside and Verbal report given to DEJAN Rodriguez on Anni Singer  being transferred to room 339  MIU for routine progression of patient care       Report consisted of patient's Situation, Background, Assessment and   Recommendations(SBAR).     Information from

## 2025-03-23 NOTE — L&D DELIVERY NOTE
3 Vessels  Complications: None  Delayed Cord Clamping?: Yes  Cord Clamped Date/Time: 3/23/2025 10:28:00  Cord Blood Disposition: Discard  Gases Sent?: No              Placenta    Date/Time: 3/23/2025 10:32:27  Removal: Spontaneous  Appearance: Intact  Disposition: Pathology       Lacerations    Episiotomy: None  Perineal Lacerations: None  Other Lacerations: no non-perineal laceration       Vaginal Counts    Initial Count Personnel: LEO PEREZ  Initial Count Verified By: DALTON DELEON RN  Intial Sponge Count: Correct Intial Needles Count: Correct Intial Instruments Count: Correct   Final Sponges Count: Correct Final Needles  Count: Correct Final Instruments Count: Correct   Final Count Personnel: CAS HOLLIS CNM  Final Count Verified By: SADIA DUBON RN  Accurate Final Count?: Yes       Blood Loss  Mother: Anni Singer #[id number]     Start of Mother's Information      Delivery Blood Loss   Intrapartum & Postpartum: 25 0600 - 25 181    Delivery Admission: 25 06 - 25 1814         Intrapartum & Postpartum Delivery Admission    Quantitative Blood Loss (mL) Hospital Encounter 338 grams 338 grams    Total  338 mL 338 mL               End of Mother's Information  Mother: Anni Singer #[id number]                Delivery Providers    Delivering clinician: Oma Hollis APRN - CNM     Provider Role    Oma Hollis APRN - CNM Obstetrician    Seaver, Darla J, RN Primary Nurse    Inna Dubon RN Primary  Nurse    Velvet Guzman RN NICU Nurse    John Hdz MD Neonatologist    Ama Chaves RN Nursery Nurse    Danilo Chowdary, RT Respiratory Therapist    Nunu Ferraro RN NICU Nurse              [place] Assessment    Living Status: Living  Delivery Location Comment: RM 11        Skin Color:   Heart Rate:   Reflex Irritability:   Muscle Tone:   Respiratory Effort:   Total:            1 Minute:    0    1    1    1    1    4         5 Minute:    1    2    2

## 2025-03-23 NOTE — ED TRIAGE NOTES
3/23/2025  8:05 AM -  patient here to [facility] for labor. No prenatal care in this pregnancy. Previous vaginal deliveries, patient stated no history of c sections or need for blood transfusion. Desires epidural     0807 - CAS Hollis CNM at bedside, SVE 7100/+1. Plan to admit to L&D Rm 11.    0014 - Bedside shift change report given to D Seaver RN (oncoming nurse) by SADIA Dubon RN (offgoing nurse). Report included the following information Nurse Handoff Report.

## 2025-03-24 LAB
C TRACH DNA SPEC QL NAA+PROBE: NEGATIVE
N GONORRHOEA DNA SPEC QL NAA+PROBE: NEGATIVE
RPR SER QL: NONREACTIVE
SAMPLE TYPE: NORMAL
SERVICE CMNT-IMP: NORMAL
SPECIMEN SOURCE: NORMAL

## 2025-03-24 PROCEDURE — 1120000000 HC RM PRIVATE OB

## 2025-03-24 PROCEDURE — 6370000000 HC RX 637 (ALT 250 FOR IP): Performed by: ADVANCED PRACTICE MIDWIFE

## 2025-03-24 RX ADMIN — DOCUSATE SODIUM 100 MG: 100 CAPSULE, LIQUID FILLED ORAL at 21:21

## 2025-03-24 RX ADMIN — IBUPROFEN 800 MG: 400 TABLET, FILM COATED ORAL at 21:21

## 2025-03-24 RX ADMIN — BUPRENORPHINE HCL 8 MG: 2 TABLET SUBLINGUAL at 18:14

## 2025-03-24 RX ADMIN — ACETAMINOPHEN 1000 MG: 500 TABLET ORAL at 18:19

## 2025-03-24 RX ADMIN — IBUPROFEN 800 MG: 400 TABLET, FILM COATED ORAL at 02:01

## 2025-03-24 RX ADMIN — DOCUSATE SODIUM 100 MG: 100 CAPSULE, LIQUID FILLED ORAL at 10:09

## 2025-03-24 NOTE — PROGRESS NOTES
0740: Bedside and Verbal shift change report given to Carlos RN (oncoming nurse) by Nolan WYLIE (offgoing nurse). Report included the following information Nurse Handoff Report.

## 2025-03-24 NOTE — PROGRESS NOTES
2150: Pt requesting hospital privileges to go outside for \"fresh air\". No order seen in current orders. Spoke with Oma Hollis CNM (attending) who gave verbal order for okay for hospital privilege. Patient informed.

## 2025-03-24 NOTE — PROGRESS NOTES
Department of Obstetrics and Gynecology  Attending Post Partum Progress Note      SUBJECTIVE:  pt is PPD1 s/p uncomplicated . Feels well. Baby is in NICU for respiratroy distress    OBJECTIVE:      Vitals:  BP 91/61   Pulse 64   Temp 98.2 °F (36.8 °C) (Oral)   Resp 18   Ht 1.575 m (5' 2\")   Wt 54.4 kg (120 lb)   SpO2 97%   Breastfeeding Unknown   BMI 21.95 kg/m²     Resting in bed comfortably    DATA:    Recent Results (from the past 48 hours)   CBC with Auto Differential    Collection Time: 25  8:22 AM   Result Value Ref Range    WBC 12.2 (H) 3.6 - 11.0 K/uL    RBC 4.36 3.80 - 5.20 M/uL    Hemoglobin 13.6 11.5 - 16.0 g/dL    Hematocrit 40.1 35.0 - 47.0 %    MCV 92.0 80.0 - 99.0 FL    MCH 31.2 26.0 - 34.0 PG    MCHC 33.9 30.0 - 36.5 g/dL    RDW 12.5 11.5 - 14.5 %    Platelets 196 150 - 400 K/uL    MPV 11.1 8.9 - 12.9 FL    Nucleated RBCs 0.0 0  WBC    nRBC 0.00 0.00 - 0.01 K/uL    Neutrophils % 73.5 32.0 - 75.0 %    Lymphocytes % 14.9 12.0 - 49.0 %    Monocytes % 10.5 5.0 - 13.0 %    Eosinophils % 0.2 0.0 - 7.0 %    Basophils % 0.3 0.0 - 1.0 %    Immature Granulocytes % 0.6 (H) 0.0 - 0.5 %    Neutrophils Absolute 8.96 (H) 1.80 - 8.00 K/UL    Lymphocytes Absolute 1.81 0.80 - 3.50 K/UL    Monocytes Absolute 1.28 (H) 0.00 - 1.00 K/UL    Eosinophils Absolute 0.02 0.00 - 0.40 K/UL    Basophils Absolute 0.04 0.00 - 0.10 K/UL    Immature Granulocytes Absolute 0.07 (H) 0.00 - 0.04 K/UL    Differential Type AUTOMATED     TYPE AND SCREEN    Collection Time: 25  8:22 AM   Result Value Ref Range    Crossmatch expiration date 2025,1236     ABO/Rh A POSITIVE     Antibody Screen NEG    Hepatitis B surface antigen    Collection Time: 25  8:22 AM   Result Value Ref Range    Hepatitis B Surface Ag 0.15 Index    Hep B S Ag Interp Negative NEG     Urinalysis    Collection Time: 25  8:22 AM   Result Value Ref Range    Color, UA YELLOW/STRAW      Appearance CLEAR CLEAR      Specific

## 2025-03-24 NOTE — CONSULTS
Running Out of Food in the Last Year: Never true     Ran Out of Food in the Last Year: Never true   Transportation Needs: Unmet Transportation Needs (3/23/2025)    PRAPARE - Transportation     Lack of Transportation (Medical): Yes     Lack of Transportation (Non-Medical): Yes   Physical Activity: Not on file   Stress: Not on file   Social Connections: Not on file   Intimate Partner Violence: Not on file   Housing Stability: Low Risk  (3/23/2025)    Housing Stability Vital Sign     Unable to Pay for Housing in the Last Year: No     Number of Times Moved in the Last Year: 1     Homeless in the Last Year: No      FAMILY HISTORY: no MARY   REVIEW OF SYSTEMS:   A 12 system review of systems was completed and was negative for the following 12 systems except as noted above CV, Pulmonary, HEENT, skin, lymphatics, GI, , hematologic, renal, endocrinology, rheumatologic, MSK         Physical Exam     /68   Pulse 71   Temp 97.7 °F (36.5 °C) (Oral)   Resp 16   Ht 1.575 m (5' 2\")   Wt 54.4 kg (120 lb)   SpO2 99%   Breastfeeding Unknown   BMI 21.95 kg/m²     COWS:2 mild withdrawal      Alert and Oriented, PERRL  No distress  Non labored respirations  Regular pulse  Abdomen soft  No edema in legs  Neuro grossly intact  No track marks           DATA     LABORATORY DATA:  Labs Reviewed   CBC WITH AUTO DIFFERENTIAL - Abnormal; Notable for the following components:       Result Value    WBC 12.2 (*)     Immature Granulocytes % 0.6 (*)     Neutrophils Absolute 8.96 (*)     Monocytes Absolute 1.28 (*)     Immature Granulocytes Absolute 0.07 (*)     All other components within normal limits   URINALYSIS - Abnormal; Notable for the following components:    Ketones, Urine 40 (*)     All other components within normal limits   URINE DRUG SCREEN - Abnormal; Notable for the following components:    Cocaine, Urine Positive (*)     THC, TH-Cannabinol, Urine Positive (*)     All other components within normal limits   CULTURE,

## 2025-03-24 NOTE — CARE COORDINATION
Attempted to meet with patient at bedside, but she was not in the room. CM will come back after lunch.    Nevaeh Aparicio, [facility]  Care Management [facility]  [phone]

## 2025-03-24 NOTE — CARE COORDINATION
Care Management Initial Assessment       RUR: 3%--low  Readmission? No  1st IM letter given? No  1st  letter given: No    Emergency contact: Raven Singer, mother, [phone]    Patient admitted 3/23 with uterine contractions at 40w GA. CM consult placed as patient did not access prenatal care and UDS was positive for cocaine for patient and infant. Patient is on subutex and reports accessing treatment at [facility] in [place] [phone]). CM met with patient and significant other at bedside. Demographics confirmed. CM explained that she will be making a CPS report due to infants positive UDS. CM completed assessment; gathering information needed for CPS report as well as general NICU assessment. CM provided general information about the process of CPS safety planning. CM also recommended that patient and partner visit frequently to hold and feed baby. CM also discussed eat, sleep, console protocol and withdrawal symptoms. CM did contact [facility] CPS ([phone]) to make report. CM was informed that case will be assigned to a worker and someone will visit baby within 24 hours.    Mother did note interest in list of OB/GYN providers, as well as pediatricians for infant. She is also open to accepting referrals for additional community supports and services.     03/24/25 1523   Service Assessment   Patient Orientation Alert and Oriented   Cognition Alert   History Provided By Patient   Primary Caregiver Self   Support Systems Spouse/Significant Other;Parent   PCP Verified by CM No   Prior Functional Level Independent in ADLs/IADLs   Current Functional Level Independent in ADLs/IADLs   Can patient return to prior living arrangement Yes   Ability to make needs known: Good   Financial Resources Medicaid   Community Resources [facility]  (outpatient substance use treatment)   CM/SW Referral Other (see comment)  (infant's UDS + for cocaine)   Social/Functional History   Lives With

## 2025-03-25 VITALS
WEIGHT: 120 LBS | DIASTOLIC BLOOD PRESSURE: 77 MMHG | HEART RATE: 79 BPM | HEIGHT: 62 IN | OXYGEN SATURATION: 100 % | BODY MASS INDEX: 22.08 KG/M2 | TEMPERATURE: 98.2 F | SYSTOLIC BLOOD PRESSURE: 106 MMHG | RESPIRATION RATE: 16 BRPM

## 2025-03-25 PROBLEM — O09.30 INSUFFICIENT PRENATAL CARE, DELIVERED, CURRENT HOSPITALIZATION: Status: ACTIVE | Noted: 2025-03-25

## 2025-03-25 PROBLEM — O99.323 DRUG USE AFFECTING PREGNANCY IN THIRD TRIMESTER: Status: ACTIVE | Noted: 2025-03-25

## 2025-03-25 PROCEDURE — 6370000000 HC RX 637 (ALT 250 FOR IP): Performed by: ADVANCED PRACTICE MIDWIFE

## 2025-03-25 RX ORDER — IBUPROFEN 800 MG/1
800 TABLET, FILM COATED ORAL EVERY 8 HOURS SCHEDULED
Qty: 120 TABLET | Refills: 3 | Status: SHIPPED | OUTPATIENT
Start: 2025-03-25

## 2025-03-25 RX ADMIN — ACETAMINOPHEN 1000 MG: 500 TABLET ORAL at 05:09

## 2025-03-25 RX ADMIN — IBUPROFEN 800 MG: 400 TABLET, FILM COATED ORAL at 05:08

## 2025-03-25 RX ADMIN — DOCUSATE SODIUM 100 MG: 100 CAPSULE, LIQUID FILLED ORAL at 09:20

## 2025-03-25 NOTE — PROGRESS NOTES
Post-Partum Day Number 2 Progress Note    Anni Singer     Assessment: Doing well, post partum day 2; not reporting any withdrawal symptoms to me or PP issues; learned after rounding on pt that baby Hardy is being taken from her custody today by CPS.    Plan:   1. Discharge home today  2. Follow up in office in 6 weeks with [facility] but also getting list of [place] providers from Case Management  3. Post partum activity advised, diet as tolerated  4. Discharge Medications: ibuprofen, and medications prior to admission  5. Consented for circumcision of  male: reviewed no definitive medical reason for circumcision, risk of bleeding, infection, varying cosmetic results and potential need for revision.  Consent signed and placed in baby's chart in NICU.    Information for the patient's :  TEO Singer [[id number]]   Vaginal, Spontaneous Patient doing well without significant complaint.  Voiding without difficulty, normal lochia.  Bottle feeding exclusively.    Vitals:  BP 98/68   Pulse 82   Temp 98.1 °F (36.7 °C) (Oral)   Resp 16   Ht 1.575 m (5' 2\")   Wt 54.4 kg (120 lb)   SpO2 95%   Breastfeeding Unknown   BMI 21.95 kg/m²   Temp (24hrs), Av.1 °F (36.7 °C), Min:97.7 °F (36.5 °C), Max:98.4 °F (36.9 °C)      Exam:         Patient without distress.                 Abdomen soft, fundus firm, nontender                 Lower extremities are negative for swelling, cords or tenderness.    Labs:     Lab Results   Component Value Date/Time    WBC 12.2 2025 08:22 AM    WBC 8.9 2021 03:28 PM    HGB 13.6 2025 08:22 AM    HGB 10.6 2021 03:28 PM    HCT 40.1 2025 08:22 AM    HCT 31.2 2021 03:28 PM     2025 08:22 AM     2021 03:28 PM       No results found for this or any previous visit (from the past 24 hours).

## 2025-03-25 NOTE — CARE COORDINATION
Transition of Care Plan:    RUR: 3%--low  Prior Level of Functioning: independent  Disposition: home  MONTY: 3/25  Follow up appointments: OB/GYN  DME needed: No  Transportation at discharge: in car w/partner  IM/IMM Medicare/ letter given: N/A  Is patient a [place] and connected with [facility]? no   If yes, was  transfer form completed and [facility] notified?   Caregiver Contact: Hardy Bret, significant other, [phone]   Discharge Caregiver contacted prior to discharge? N/A  Care Conference needed? no  Barriers to discharge:  none noted    Patient admitted 3/23 with uterine contractions at 40w GA. No prenatal care and history of polysubstance use. [facility] [place] met with patient and significant other to develop safety plan for infant who is still being monitored in NICU. CM provided patient with three options for OB/GYN care for follow up. CM also provided patient with information for [employer]; a community resource for mothers with history of substance use disorder. CM will continue to follow patient's infant in NICU.     Nevaeh Aparicio LMSW  Care Management [facility]  [phone]

## 2025-03-25 NOTE — PROGRESS NOTES
Bedside shift change report given to AILEEN Guaman RN (oncoming nurse) by NAYELI Sparks RN (offgoing nurse). Report included the following information Nurse Handoff Report.

## 2025-03-25 NOTE — DISCHARGE SUMMARY
Obstetrical Discharge Summary     Name: Anni Singer MRN: [id number]  SSN: [id number]    YOB: 2000  Age: 24 y.o.  Sex: female      Admit Date: 3/23/2025    Discharge Date: 3/25/2025     Admitting Physician: Dago Zee MD     Attending Physician:  Dago eZe MD     Admission Diagnoses: Uterine contractions [O47.9]    Discharge Diagnoses:   Information for the patient's :  TEO Singer [[id number]]   @[id number]@     Additional Diagnoses:  No components found for: \"OBEXTABORH\", \"OBEXTABSCRN\", \"OBEXTRUBELLA\", \"OBEXTGRBS\"    Hospital Course: Patient,  @ 40 3/7 wks, was admitted in active labor.  No prenatal care (one  visit with establishment of due date), making her 40+ weeks on admission.  Drug screen positive for cocaine, on buprenorphine for h/o heroin use.  Epidural for labor pain management.  Uncomplicated  without lacerations of a VMI with apgars 4, 8 and weight 2800 g who was taken to NICU.  Screen positive for cocaine. Uncomplicated PP course for pt.  Case management and addiction medicine consults placed.  Plan was to continue buprenorphine 8 mg daily.  Plan is to follow up in [facility] OB[facility] clinic in [place].  At the time of discharge, CPS was taking custody of baby.    Disposition at Discharge: Home or self care    Discharged Condition: Stable    Patient Instructions:   Current Discharge Medication List        START taking these medications    Details   ibuprofen (ADVIL;MOTRIN) 800 MG tablet Take 1 tablet by mouth every 8 hours  Qty: 120 tablet, Refills: 3           CONTINUE these medications which have NOT CHANGED    Details   buprenorphine (SUBUTEX) 8 MG SUBL SL tablet Place 1 tablet under the tongue daily.             Reference my discharge instructions.    Given names of OBGYN local to her for f/up.  Otherwise pt advised to f[facility]up at the [facility].    Signed By:  Lisseth Smith MD     2025

## 2025-03-26 LAB
BACTERIA SPEC CULT: NORMAL
SERVICE CMNT-IMP: NORMAL